# Patient Record
Sex: MALE
[De-identification: names, ages, dates, MRNs, and addresses within clinical notes are randomized per-mention and may not be internally consistent; named-entity substitution may affect disease eponyms.]

---

## 2018-07-23 ENCOUNTER — HOSPITAL ENCOUNTER (EMERGENCY)
Dept: HOSPITAL 25 - UCEAST | Age: 73
Discharge: HOME | End: 2018-07-23
Payer: COMMERCIAL

## 2018-07-23 VITALS — DIASTOLIC BLOOD PRESSURE: 85 MMHG | SYSTOLIC BLOOD PRESSURE: 150 MMHG

## 2018-07-23 DIAGNOSIS — Z87.891: ICD-10-CM

## 2018-07-23 DIAGNOSIS — R21: Primary | ICD-10-CM

## 2018-07-23 DIAGNOSIS — Z88.2: ICD-10-CM

## 2018-07-23 PROCEDURE — 99212 OFFICE O/P EST SF 10 MIN: CPT

## 2018-07-23 PROCEDURE — G0463 HOSPITAL OUTPT CLINIC VISIT: HCPCS

## 2018-07-23 NOTE — UC
Skin Complaint HPI





- HPI Summary


HPI Summary: 





A 71 y/o M presents to [facility] [place] with c/o sudden onset R forearm growth onset approx 

1 week ago. Denies pain at location. Pt states it hasn't changed in size since 

onset. Pt has some concerns it could be a tick bite, states he spends a lot of 

time outdoors. Denies trauma to the area. 








This is SooYoung sterling Garcia, documenting for attending, Dr. Nathan Carbone MD.











- History of Current Complaint


Chief Complaint: UCSkin


Time Seen by Provider: 07/23/18 11:25


Stated Complaint: SKIN COMPLAINT


Hx Obtained From: Patient, Family/Caretaker


Onset/Duration: Sudden Onset, Lasting Days - approx 1 week, Still Present


Timing: Constant


Onset Severity: Mild


Current Severity: Mild


Pain Intensity: 0


Pain Scale Used: 0-10 Numeric


Location: Other - R forearm


Character: Raised





- Allergy/Home Medications


Allergies/Adverse Reactions: 


 Allergies











Allergy/AdvReac Type Severity Reaction Status Date / Time


 


MS Sulfa Antibiotics Allergy  Unknown Verified 07/23/18 11:26





[Sulfa Antibiotics]   Reaction  





   Details  











Home Medications: 


 Home Medications





Amiodarone TAB* [Cordarone TAB*] 200 mg PO DAILY 07/23/18 [History Confirmed 07/ 23/18]


Sacubitril/Valsartan [Entresto 24-26 mg] 1 tab PO BID 07/23/18 [History 

Confirmed 07/23/18]











Review of Systems


Constitutional: Other - neg: fever


Skin: Other - growth to R forearm


All Other Systems Reviewed And Are Negative: Yes





PMH/Surg Hx/FS Hx/Imm Hx


Previously Healthy: No


Endocrine History: Other


Other Endocrine History: neg DM


Cardiovascular History: Hypertension





- Surgical History


Surgical History: None





- Family History


Known Family History: Positive: Cardiac Disease


   Negative: Hypertension, Diabetes, Other - neg: high cholesterol





- Social History


Occupation: Retired


Lives: With Family


Alcohol Use: Daily


Substance Use Type: None


Smoking Status (MU): Former Smoker





Physical Exam





- Summary


Physical Exam Summary: 





General: well-appearing, no pain distress


Skin: warm, color reflects adequate perfusion, dry; Dorsum of R wrist about 1.5 

cm, fibrous dome   


Head: normal


Eyes: EOMI, OZZIE


ENT: normal


Neck: supple, nontender


Respiratory: CTA, breath sounds present


Cardiovascular: RRR


Abdomen: soft, nontender


Bowel: present


Musculoskeletal: normal, strength/ROM intact


Neurological: sensory/motor intact, A&O x3


Psychological: affect/mood appropriate








Triage Information Reviewed: Yes


Vital Signs: 


 Initial Vital Signs











Temp  98.9 F   07/23/18 11:23


 


Pulse  65   07/23/18 11:23


 


Resp  18   07/23/18 11:23


 


BP  150/85   07/23/18 11:23


 


Pulse Ox  99   07/23/18 11:23











Vital Signs Reviewed: Yes





Course/Dx





- Course


Course Of Treatment: BP noted and advised to follow up with PCP. Medications 

reviewed. Allergies noted.  THE RAISED AREA ON THE DORSUM OF THE RIGHT WRIST 

WAS FIRM TO PALPATION. NO OBVIOUS FLUID COLLECTION TO DRAIN. DISCUSSED I AND D 

ATTEMPT WITH THE PATIENT; HE DECLINED AT THIS TIME.  WILL RX ABX AND F/U 

DERMATOLOGY IF NOT IMPROVED; RECHECK SOONER IF WORSE.





- Diagnoses


Provider Diagnoses: RIGHT WRIST RASH





Discharge





- Sign-Out/Discharge


Documenting (check all that apply): Patient Departure





- Discharge Plan


Condition: Stable


Disposition: HOME


Prescriptions: 


DOXYcycline CAP(*) [DOXYcycline 100MG CAP(*)] 100 mg PO BID #20 cap


Patient Education Materials:  Acute Rash (ED)


Referrals: 


[facility] PHYSICIAN REFERRAL [Outside]


Alistair Nunez MD [Medical Doctor] - 


Additional Instructions: 


FOLLOW UP WITH DERMATOLOGY.


GET RECHECKED FOR ANY WORSENING OF YOUR CONDITION OR QUESTIONS OR CONCERNS.


Your blood pressure was elevated during todays visit; please follow up with 

your primary care provider within a week for further evaluation.





- Billing Disposition and Condition


Condition: STABLE


Disposition: Home